# Patient Record
Sex: FEMALE | Race: WHITE | Employment: OTHER | URBAN - METROPOLITAN AREA
[De-identification: names, ages, dates, MRNs, and addresses within clinical notes are randomized per-mention and may not be internally consistent; named-entity substitution may affect disease eponyms.]

---

## 2017-11-20 PROCEDURE — 87086 URINE CULTURE/COLONY COUNT: CPT | Performed by: FAMILY MEDICINE

## 2017-11-20 PROCEDURE — 87624 HPV HI-RISK TYP POOLED RSLT: CPT | Performed by: FAMILY MEDICINE

## 2017-11-20 PROCEDURE — 81003 URINALYSIS AUTO W/O SCOPE: CPT | Performed by: FAMILY MEDICINE

## 2017-11-20 PROCEDURE — 88175 CYTOPATH C/V AUTO FLUID REDO: CPT | Performed by: FAMILY MEDICINE

## 2018-09-05 PROCEDURE — 87081 CULTURE SCREEN ONLY: CPT | Performed by: EMERGENCY MEDICINE

## 2018-11-21 PROCEDURE — 86803 HEPATITIS C AB TEST: CPT | Performed by: FAMILY MEDICINE

## 2019-05-23 PROCEDURE — 86160 COMPLEMENT ANTIGEN: CPT | Performed by: FAMILY MEDICINE

## 2019-05-23 PROCEDURE — 82570 ASSAY OF URINE CREATININE: CPT | Performed by: FAMILY MEDICINE

## 2019-05-23 PROCEDURE — 84156 ASSAY OF PROTEIN URINE: CPT | Performed by: FAMILY MEDICINE

## 2019-05-23 PROCEDURE — 81003 URINALYSIS AUTO W/O SCOPE: CPT | Performed by: FAMILY MEDICINE

## 2019-05-25 PROCEDURE — 84300 ASSAY OF URINE SODIUM: CPT | Performed by: FAMILY MEDICINE

## 2019-05-25 PROCEDURE — 83930 ASSAY OF BLOOD OSMOLALITY: CPT | Performed by: FAMILY MEDICINE

## 2019-06-11 PROCEDURE — 82024 ASSAY OF ACTH: CPT | Performed by: FAMILY MEDICINE

## 2019-07-06 ENCOUNTER — LAB ENCOUNTER (OUTPATIENT)
Dept: LAB | Age: 59
End: 2019-07-06
Attending: INTERNAL MEDICINE
Payer: COMMERCIAL

## 2019-07-06 DIAGNOSIS — Z00.00 ROUTINE GENERAL MEDICAL EXAMINATION AT A HEALTH CARE FACILITY: Primary | ICD-10-CM

## 2019-07-06 LAB
CRP SERPL-MCNC: <0.29 MG/DL (ref ?–0.3)
SED RATE-ML: 7 MM/HR (ref 0–25)

## 2019-07-06 PROCEDURE — 85652 RBC SED RATE AUTOMATED: CPT

## 2019-07-06 PROCEDURE — 86140 C-REACTIVE PROTEIN: CPT

## 2019-07-23 PROBLEM — E34.8 PINEAL GLAND CYST: Status: ACTIVE | Noted: 2019-07-23

## 2019-07-23 PROBLEM — I10 INTERMITTENT HYPERTENSION: Status: ACTIVE | Noted: 2019-07-23

## 2019-07-24 ENCOUNTER — LAB ENCOUNTER (OUTPATIENT)
Dept: LAB | Age: 59
End: 2019-07-24
Attending: INTERNAL MEDICINE
Payer: COMMERCIAL

## 2019-07-24 DIAGNOSIS — Z79.899 METHOTREXATE, LONG TERM, CURRENT USE: Primary | ICD-10-CM

## 2019-07-24 LAB
ALBUMIN SERPL-MCNC: 4.3 G/DL (ref 3.4–5)
ALBUMIN/GLOB SERPL: 1.6 {RATIO} (ref 1–2)
ALP LIVER SERPL-CCNC: 54 U/L (ref 50–130)
ALT SERPL-CCNC: 32 U/L (ref 13–56)
ANION GAP SERPL CALC-SCNC: 6 MMOL/L (ref 0–18)
AST SERPL-CCNC: 15 U/L (ref 15–37)
BASOPHILS # BLD AUTO: 0 X10(3) UL (ref 0–0.2)
BASOPHILS NFR BLD AUTO: 0 %
BILIRUB SERPL-MCNC: 0.8 MG/DL (ref 0.1–2)
BUN BLD-MCNC: 18 MG/DL (ref 7–18)
BUN/CREAT SERPL: 20.5 (ref 10–20)
CALCIUM BLD-MCNC: 9.9 MG/DL (ref 8.5–10.1)
CHLORIDE SERPL-SCNC: 96 MMOL/L (ref 98–112)
CO2 SERPL-SCNC: 32 MMOL/L (ref 21–32)
CREAT BLD-MCNC: 0.88 MG/DL (ref 0.55–1.02)
DEPRECATED RDW RBC AUTO: 47 FL (ref 35.1–46.3)
EOSINOPHIL # BLD AUTO: 0 X10(3) UL (ref 0–0.7)
EOSINOPHIL NFR BLD AUTO: 0 %
ERYTHROCYTE [DISTWIDTH] IN BLOOD BY AUTOMATED COUNT: 13.9 % (ref 11–15)
GLOBULIN PLAS-MCNC: 2.7 G/DL (ref 2.8–4.4)
GLUCOSE BLD-MCNC: 93 MG/DL (ref 70–99)
HCT VFR BLD AUTO: 38.1 % (ref 35–48)
HGB BLD-MCNC: 12.6 G/DL (ref 12–16)
IMM GRANULOCYTES # BLD AUTO: 0.06 X10(3) UL (ref 0–1)
IMM GRANULOCYTES NFR BLD: 0.8 %
LYMPHOCYTES # BLD AUTO: 0.27 X10(3) UL (ref 1–4)
LYMPHOCYTES NFR BLD AUTO: 3.6 %
M PROTEIN MFR SERPL ELPH: 7 G/DL (ref 6.4–8.2)
MCH RBC QN AUTO: 31 PG (ref 26–34)
MCHC RBC AUTO-ENTMCNC: 33.1 G/DL (ref 31–37)
MCV RBC AUTO: 93.6 FL (ref 80–100)
MONOCYTES # BLD AUTO: 0.24 X10(3) UL (ref 0.1–1)
MONOCYTES NFR BLD AUTO: 3.2 %
NEUTROPHILS # BLD AUTO: 6.97 X10 (3) UL (ref 1.5–7.7)
NEUTROPHILS # BLD AUTO: 6.97 X10(3) UL (ref 1.5–7.7)
NEUTROPHILS NFR BLD AUTO: 92.4 %
OSMOLALITY SERPL CALC.SUM OF ELEC: 280 MOSM/KG (ref 275–295)
PLATELET # BLD AUTO: 301 10(3)UL (ref 150–450)
POTASSIUM SERPL-SCNC: 5.5 MMOL/L (ref 3.5–5.1)
RBC # BLD AUTO: 4.07 X10(6)UL (ref 3.8–5.3)
SODIUM SERPL-SCNC: 134 MMOL/L (ref 136–145)
WBC # BLD AUTO: 7.5 X10(3) UL (ref 4–11)

## 2019-07-24 PROCEDURE — 85025 COMPLETE CBC W/AUTO DIFF WBC: CPT

## 2019-07-24 PROCEDURE — 80053 COMPREHEN METABOLIC PANEL: CPT

## 2019-08-12 PROBLEM — M23.91 ACUTE INTERNAL DERANGEMENT OF RIGHT KNEE: Status: ACTIVE | Noted: 2019-08-12

## 2019-08-26 PROBLEM — Z98.890 S/P RIGHT KNEE ARTHROSCOPY: Status: ACTIVE | Noted: 2019-08-26

## 2019-09-03 PROBLEM — Z98.890 STATUS POST LATERAL MENISCAL REPAIR: Status: ACTIVE | Noted: 2019-09-03

## 2023-07-27 ENCOUNTER — OFFICE VISIT (OUTPATIENT)
Dept: RHEUMATOLOGY | Facility: CLINIC | Age: 63
End: 2023-07-27

## 2023-07-27 ENCOUNTER — LAB ENCOUNTER (OUTPATIENT)
Dept: LAB | Age: 63
End: 2023-07-27
Attending: INTERNAL MEDICINE
Payer: MEDICARE

## 2023-07-27 VITALS
WEIGHT: 126.38 LBS | DIASTOLIC BLOOD PRESSURE: 82 MMHG | SYSTOLIC BLOOD PRESSURE: 147 MMHG | HEIGHT: 67.5 IN | BODY MASS INDEX: 19.6 KG/M2 | HEART RATE: 79 BPM

## 2023-07-27 DIAGNOSIS — M32.9 SYSTEMIC LUPUS ERYTHEMATOSUS, UNSPECIFIED SLE TYPE, UNSPECIFIED ORGAN INVOLVEMENT STATUS (HCC): Primary | ICD-10-CM

## 2023-07-27 DIAGNOSIS — Z51.81 MEDICATION MONITORING ENCOUNTER: ICD-10-CM

## 2023-07-27 DIAGNOSIS — M32.9 SYSTEMIC LUPUS ERYTHEMATOSUS, UNSPECIFIED SLE TYPE, UNSPECIFIED ORGAN INVOLVEMENT STATUS (HCC): ICD-10-CM

## 2023-07-27 LAB
ALBUMIN SERPL-MCNC: 4.2 G/DL (ref 3.4–5)
ALBUMIN/GLOB SERPL: 1.3 {RATIO} (ref 1–2)
ALP LIVER SERPL-CCNC: 47 U/L
ALT SERPL-CCNC: 29 U/L
ANION GAP SERPL CALC-SCNC: 5 MMOL/L (ref 0–18)
AST SERPL-CCNC: 21 U/L (ref 15–37)
BASOPHILS # BLD AUTO: 0.05 X10(3) UL (ref 0–0.2)
BASOPHILS NFR BLD AUTO: 1.6 %
BILIRUB SERPL-MCNC: 0.5 MG/DL (ref 0.1–2)
BILIRUB UR QL: NEGATIVE
BUN BLD-MCNC: 10 MG/DL (ref 7–18)
BUN/CREAT SERPL: 14.1 (ref 10–20)
C3 SERPL-MCNC: 73.3 MG/DL (ref 90–180)
C4 SERPL-MCNC: 12.7 MG/DL (ref 10–40)
CALCIUM BLD-MCNC: 9.4 MG/DL (ref 8.5–10.1)
CHLORIDE SERPL-SCNC: 101 MMOL/L (ref 98–112)
CLARITY UR: CLEAR
CO2 SERPL-SCNC: 29 MMOL/L (ref 21–32)
COLOR UR: COLORLESS
CREAT BLD-MCNC: 0.71 MG/DL
CREAT UR-SCNC: 17.5 MG/DL
CRP SERPL-MCNC: <0.29 MG/DL (ref ?–0.3)
DEPRECATED RDW RBC AUTO: 42 FL (ref 35.1–46.3)
EGFRCR SERPLBLD CKD-EPI 2021: 94 ML/MIN/1.73M2 (ref 60–?)
EOSINOPHIL # BLD AUTO: 0.08 X10(3) UL (ref 0–0.7)
EOSINOPHIL NFR BLD AUTO: 2.6 %
ERYTHROCYTE [DISTWIDTH] IN BLOOD BY AUTOMATED COUNT: 12.5 % (ref 11–15)
ERYTHROCYTE [SEDIMENTATION RATE] IN BLOOD: 8 MM/HR
FASTING STATUS PATIENT QL REPORTED: NO
GLOBULIN PLAS-MCNC: 3.2 G/DL (ref 2.8–4.4)
GLUCOSE BLD-MCNC: 108 MG/DL (ref 70–99)
GLUCOSE UR-MCNC: NORMAL MG/DL
HCT VFR BLD AUTO: 38.5 %
HGB BLD-MCNC: 13 G/DL
HGB UR QL STRIP.AUTO: NEGATIVE
IMM GRANULOCYTES # BLD AUTO: 0 X10(3) UL (ref 0–1)
IMM GRANULOCYTES NFR BLD: 0 %
KETONES UR-MCNC: NEGATIVE MG/DL
LEUKOCYTE ESTERASE UR QL STRIP.AUTO: NEGATIVE
LYMPHOCYTES # BLD AUTO: 0.7 X10(3) UL (ref 1–4)
LYMPHOCYTES NFR BLD AUTO: 22.4 %
MCH RBC QN AUTO: 31 PG (ref 26–34)
MCHC RBC AUTO-ENTMCNC: 33.8 G/DL (ref 31–37)
MCV RBC AUTO: 91.9 FL
MONOCYTES # BLD AUTO: 0.28 X10(3) UL (ref 0.1–1)
MONOCYTES NFR BLD AUTO: 9 %
NEUTROPHILS # BLD AUTO: 2.01 X10 (3) UL (ref 1.5–7.7)
NEUTROPHILS # BLD AUTO: 2.01 X10(3) UL (ref 1.5–7.7)
NEUTROPHILS NFR BLD AUTO: 64.4 %
NITRITE UR QL STRIP.AUTO: NEGATIVE
OSMOLALITY SERPL CALC.SUM OF ELEC: 280 MOSM/KG (ref 275–295)
PH UR: 6.5 [PH] (ref 5–8)
PLATELET # BLD AUTO: 259 10(3)UL (ref 150–450)
POTASSIUM SERPL-SCNC: 3.9 MMOL/L (ref 3.5–5.1)
PROT SERPL-MCNC: 7.4 G/DL (ref 6.4–8.2)
PROT UR-MCNC: <5 MG/DL
PROT UR-MCNC: NEGATIVE MG/DL
RBC # BLD AUTO: 4.19 X10(6)UL
SODIUM SERPL-SCNC: 135 MMOL/L (ref 136–145)
SP GR UR STRIP: <1.005 (ref 1–1.03)
UROBILINOGEN UR STRIP-ACNC: NORMAL
WBC # BLD AUTO: 3.1 X10(3) UL (ref 4–11)

## 2023-07-27 PROCEDURE — 83516 IMMUNOASSAY NONANTIBODY: CPT

## 2023-07-27 PROCEDURE — 85025 COMPLETE CBC W/AUTO DIFF WBC: CPT | Performed by: INTERNAL MEDICINE

## 2023-07-27 PROCEDURE — 82570 ASSAY OF URINE CREATININE: CPT

## 2023-07-27 PROCEDURE — 99204 OFFICE O/P NEW MOD 45 MIN: CPT | Performed by: INTERNAL MEDICINE

## 2023-07-27 PROCEDURE — 36415 COLL VENOUS BLD VENIPUNCTURE: CPT | Performed by: INTERNAL MEDICINE

## 2023-07-27 PROCEDURE — 85652 RBC SED RATE AUTOMATED: CPT | Performed by: INTERNAL MEDICINE

## 2023-07-27 PROCEDURE — 80053 COMPREHEN METABOLIC PANEL: CPT | Performed by: INTERNAL MEDICINE

## 2023-07-27 PROCEDURE — 86036 ANCA SCREEN EACH ANTIBODY: CPT

## 2023-07-27 PROCEDURE — 84156 ASSAY OF PROTEIN URINE: CPT

## 2023-07-27 PROCEDURE — 86235 NUCLEAR ANTIGEN ANTIBODY: CPT

## 2023-07-27 PROCEDURE — 86160 COMPLEMENT ANTIGEN: CPT | Performed by: INTERNAL MEDICINE

## 2023-07-27 PROCEDURE — 86038 ANTINUCLEAR ANTIBODIES: CPT | Performed by: INTERNAL MEDICINE

## 2023-07-27 PROCEDURE — 86140 C-REACTIVE PROTEIN: CPT | Performed by: INTERNAL MEDICINE

## 2023-07-27 RX ORDER — NIFEDIPINE 30 MG/1
30 TABLET, FILM COATED, EXTENDED RELEASE ORAL DAILY
Qty: 90 TABLET | Refills: 1 | Status: SHIPPED | OUTPATIENT
Start: 2023-07-27

## 2023-07-27 RX ORDER — DEXTROAMPHETAMINE SACCHARATE, AMPHETAMINE ASPARTATE, DEXTROAMPHETAMINE SULFATE AND AMPHETAMINE SULFATE 5; 5; 5; 5 MG/1; MG/1; MG/1; MG/1
TABLET ORAL
COMMUNITY
Start: 2023-05-23

## 2023-07-27 RX ORDER — HYDROXYCHLOROQUINE SULFATE 200 MG/1
200 TABLET, FILM COATED ORAL DAILY
Qty: 90 TABLET | Refills: 1 | Status: SHIPPED | OUTPATIENT
Start: 2023-07-27

## 2023-07-27 NOTE — PATIENT INSTRUCTIONS
You were seen today for the history of lupus causing some of the minor joint pain/swelling, Raynaud's, chilblains, livedo  Symptoms appear stable  Continue Plaquenil  I refilled both Plaquenil and nifedipine  Blood work today  You can see me in 6 months

## 2023-08-04 LAB
ANCA BY IFA (RDL): NEGATIVE
ANTI-MPO AB (RDL): <20 UNITS
ANTI-PR-3 AB (RDL): <20 UNITS

## 2023-08-15 LAB
ANTI-EJ: NEGATIVE
ANTI-JO-1: <20 UNITS
ANTI-KU: NEGATIVE
ANTI-MDA-5: <20 UNITS
ANTI-MI-2 AB: NEGATIVE
ANTI-NXP-2: <20 UNITS
ANTI-OJ: NEGATIVE
ANTI-PL-12: NEGATIVE
ANTI-PL-7: NEGATIVE
ANTI-PM/SCL100: <20 UNITS
ANTI-SAE1: <20 UNITS
ANTI-SRP AB: NEGATIVE
ANTI-SSA 52KD IGG: <20 UNITS
ANTI-TIF-1GAMMA: <20 UNITS
ANTI-U1 RNP: <20 UNITS
ANTI-U2 RNP: NEGATIVE
ANTI-U3 RNP: NEGATIVE

## 2024-01-23 ENCOUNTER — OFFICE VISIT (OUTPATIENT)
Dept: RHEUMATOLOGY | Facility: CLINIC | Age: 64
End: 2024-01-23
Payer: MEDICARE

## 2024-01-23 ENCOUNTER — HOSPITAL ENCOUNTER (OUTPATIENT)
Dept: GENERAL RADIOLOGY | Facility: HOSPITAL | Age: 64
Discharge: HOME | End: 2024-01-23
Attending: INTERNAL MEDICINE
Payer: MEDICARE

## 2024-01-23 ENCOUNTER — LAB ENCOUNTER (OUTPATIENT)
Dept: LAB | Facility: HOSPITAL | Age: 64
End: 2024-01-23
Attending: INTERNAL MEDICINE
Payer: MEDICARE

## 2024-01-23 VITALS
DIASTOLIC BLOOD PRESSURE: 75 MMHG | SYSTOLIC BLOOD PRESSURE: 122 MMHG | BODY MASS INDEX: 17.68 KG/M2 | HEART RATE: 85 BPM | HEIGHT: 67.5 IN | WEIGHT: 114 LBS

## 2024-01-23 DIAGNOSIS — Z51.81 MEDICATION MONITORING ENCOUNTER: ICD-10-CM

## 2024-01-23 DIAGNOSIS — M32.9 SYSTEMIC LUPUS ERYTHEMATOSUS, UNSPECIFIED SLE TYPE, UNSPECIFIED ORGAN INVOLVEMENT STATUS (HCC): Primary | ICD-10-CM

## 2024-01-23 DIAGNOSIS — M32.9 SYSTEMIC LUPUS ERYTHEMATOSUS, UNSPECIFIED SLE TYPE, UNSPECIFIED ORGAN INVOLVEMENT STATUS (HCC): ICD-10-CM

## 2024-01-23 LAB
ALBUMIN SERPL-MCNC: 4.6 G/DL (ref 3.2–4.8)
ALT SERPL-CCNC: 16 U/L
AST SERPL-CCNC: 22 U/L (ref ?–34)
BASOPHILS # BLD AUTO: 0.04 X10(3) UL (ref 0–0.2)
BASOPHILS NFR BLD AUTO: 1.4 %
C3 SERPL-MCNC: 73.2 MG/DL (ref 90–170)
C4 SERPL-MCNC: 14 MG/DL (ref 12–36)
CREAT BLD-MCNC: 0.68 MG/DL
CRP SERPL-MCNC: <0.4 MG/DL (ref ?–1)
DEPRECATED RDW RBC AUTO: 41.4 FL (ref 35.1–46.3)
EGFRCR SERPLBLD CKD-EPI 2021: 97 ML/MIN/1.73M2 (ref 60–?)
EOSINOPHIL # BLD AUTO: 0.09 X10(3) UL (ref 0–0.7)
EOSINOPHIL NFR BLD AUTO: 3.1 %
ERYTHROCYTE [DISTWIDTH] IN BLOOD BY AUTOMATED COUNT: 12.1 % (ref 11–15)
ERYTHROCYTE [SEDIMENTATION RATE] IN BLOOD: 12 MM/HR
HCT VFR BLD AUTO: 36.9 %
HGB BLD-MCNC: 12.5 G/DL
IMM GRANULOCYTES # BLD AUTO: 0 X10(3) UL (ref 0–1)
IMM GRANULOCYTES NFR BLD: 0 %
LYMPHOCYTES # BLD AUTO: 0.72 X10(3) UL (ref 1–4)
LYMPHOCYTES NFR BLD AUTO: 25 %
MCH RBC QN AUTO: 31.3 PG (ref 26–34)
MCHC RBC AUTO-ENTMCNC: 33.9 G/DL (ref 31–37)
MCV RBC AUTO: 92.5 FL
MONOCYTES # BLD AUTO: 0.34 X10(3) UL (ref 0.1–1)
MONOCYTES NFR BLD AUTO: 11.8 %
NEUTROPHILS # BLD AUTO: 1.69 X10 (3) UL (ref 1.5–7.7)
NEUTROPHILS # BLD AUTO: 1.69 X10(3) UL (ref 1.5–7.7)
NEUTROPHILS NFR BLD AUTO: 58.7 %
PLATELET # BLD AUTO: 298 10(3)UL (ref 150–450)
RBC # BLD AUTO: 3.99 X10(6)UL
WBC # BLD AUTO: 2.9 X10(3) UL (ref 4–11)

## 2024-01-23 PROCEDURE — 71046 X-RAY EXAM CHEST 2 VIEWS: CPT | Performed by: INTERNAL MEDICINE

## 2024-01-23 PROCEDURE — 82040 ASSAY OF SERUM ALBUMIN: CPT

## 2024-01-23 PROCEDURE — 84450 TRANSFERASE (AST) (SGOT): CPT

## 2024-01-23 PROCEDURE — 86225 DNA ANTIBODY NATIVE: CPT

## 2024-01-23 PROCEDURE — 85652 RBC SED RATE AUTOMATED: CPT

## 2024-01-23 PROCEDURE — 82565 ASSAY OF CREATININE: CPT

## 2024-01-23 PROCEDURE — 86160 COMPLEMENT ANTIGEN: CPT

## 2024-01-23 PROCEDURE — 86140 C-REACTIVE PROTEIN: CPT

## 2024-01-23 PROCEDURE — 99214 OFFICE O/P EST MOD 30 MIN: CPT | Performed by: INTERNAL MEDICINE

## 2024-01-23 PROCEDURE — 85025 COMPLETE CBC W/AUTO DIFF WBC: CPT

## 2024-01-23 PROCEDURE — 84460 ALANINE AMINO (ALT) (SGPT): CPT

## 2024-01-23 PROCEDURE — 36415 COLL VENOUS BLD VENIPUNCTURE: CPT

## 2024-01-23 RX ORDER — ATOMOXETINE 80 MG/1
80 CAPSULE ORAL DAILY
COMMUNITY
Start: 2023-08-16

## 2024-01-23 RX ORDER — HYDROXYCHLOROQUINE SULFATE 200 MG/1
200 TABLET, FILM COATED ORAL DAILY
Qty: 90 TABLET | Refills: 1 | Status: SHIPPED | OUTPATIENT
Start: 2024-01-23

## 2024-01-23 RX ORDER — TRIAMCINOLONE ACETONIDE 1 MG/G
CREAM TOPICAL
Qty: 1 EACH | Refills: 0 | Status: SHIPPED | OUTPATIENT
Start: 2024-01-23

## 2024-01-23 NOTE — PATIENT INSTRUCTIONS
You were seen today for lupus  Symptoms are pretty controlled  Continue hydroxychloroquine 1 pill a day and nifedipine for the Raynaud's and chilblains  I gave you a triamcinolone, steroid cream to use once or twice a day for your child plans  Blood work today  Can see me in 6 months

## 2024-01-23 NOTE — PROGRESS NOTES
Pauline Allen is a 63 year old female.    HPI:     Chief Complaint   Patient presents with    Follow - Up     Systemic lupus erythematosus, unspecified SLE type, unspecified organ involvement status        I had the pleasure of seeing Pauline Allen on 1/23/2024 for follow up SLE (+ CARL, double-stranded DNA, low C3, RF, chilblains, Raynaud's, chronic livedo reticularis)     Current medications:   mg daily  Nifedipine 30 mg daily   Fosamax- for about 5 years  Previous medications:  Humira- stopped due to perforation of colon from routine colonoscopy   Methotrexate- no s/e but stopped after perforation of colon from routine colonoscopy   Blood work:  [facility] 2017: +ANA1:320 nuclear pattern, +dsDNA 334, RF 8.1, Cardiolipin IgM 47, atypical p-ANCA 1:80  Neg CARL, dsDNA, ESR, RF, CCPLyme , Low C3  Neg myositis panel and ANCA    Interval History:  This is a 62 yo F with hx of Osteopenia, Colonic perforation s/p partial resection 2015 presents to establish care for lupus.  She was initially seen by Dr. Santiago Hill in 2017 and at that time presented with joint pain and swelling involving her hands.  She was found to have a positive RF and was treated with methotrexate.  She then developed high inflammation markers and was switched to Humira.  She was on Humira for about 9 months with no improvement.  She was also responsive to prednisone.  She then has been seeing rheumatologist Dr. Bangura and eventually diagnosed with lupus.  She was found to have a + CARL, double-stranded DNA, RF, low C3, chilblains, Raynaud's, livedo reticularis.  She has been maintained on Plaquenil 200 mg daily and nifedipine 30 mg daily.  In the winter her Raynaud's and chilblains worsens.  She has thickening of her fingers and during the winter he has become very dry and look like  hands.  She has no weakness in her muscles.  Denies any acid reflux.  Has some telangiectasia on her nose.  She has minimal joint pain involving her  wrists.  Has some mild swelling in her right knee but no pain.  Denies any shortness of breath or chest pain.  Has chronic diarrhea after colonic perforation.  In the summer 2019 she had temple pain, temporal biopsy was negative for GCA.  Mom had ANCA vasculitis and passed away from it.    1/23/2024:  Presents for f/u of Overlap syndrome/SLE (+ CARL, double-stranded DNA, low C3, RF, chilblains, Raynaud's, chronic livedo reticularis)   She reports that her chilblains is pretty severe in December.  She had red nodules on her fingers.  Now on nifedipine and her symptoms have improved  Has some mild stiffness in her joints otherwise no swelling in her joints.  Mild pain in her wrist.  Denies any recent rash.  No recent infection.  She was seen by eye doctor January 4, no evidence of Plaquenil toxicity        HISTORY:  Past Medical History:   Diagnosis Date    Family history of rheumatoid arthritis     Raynaud's disease       Social Hx Reviewed   Family Hx Reviewed     Medications (Active prior to today's visit):  Current Outpatient Medications   Medication Sig Dispense Refill    amphetamine-dextroamphetamine (ADDERALL) 20 MG Oral Tab       hydroxychloroquine 200 MG Oral Tab Take 1 tablet (200 mg total) by mouth daily. 90 tablet 1    NIFEdipine ER 30 MG Oral Tablet 24 Hr Take 1 tablet (30 mg total) by mouth daily. 90 tablet 1    alendronate 70 MG Oral Tab Take 1 tablet (70 mg total) by mouth every 7 days. 13 tablet 1    gabapentin 300 MG Oral Cap Take one tablet one the first day, then take one tablet twice a day on day 2, then three times daily as needed 60 capsule 1    Lisdexamfetamine Dimesylate 70 MG Oral Cap Take 70 mg by mouth every morning.      Omega-3 Fatty Acids (FISH OIL OR) daily      aspirin 81 MG Oral Tab Take 81 mg by mouth daily.      escitalopram 10 MG Oral Tab Take 1 tablet (10 mg total) by mouth daily.  0    TraZODone HCl 50 MG Oral Tab        .cmed  Allergies:  Allergies   Allergen Reactions    Nickel  ITCHING         ROS:   All other ROS are negative.     PHYSICAL EXAM:   GEN: AAOx3, NAD  HEENT: EOMI, PERRLA, no injection or icterus, oral mucosa moist, no oral lesions. No lymphadenopathy. No facial rash  CVS: RRR, no murmurs rubs or gallops. Equal 2+ distal pulses.   LUNGS: CTAB, no increased work of breathing  ABDOMEN:  soft NT/ND, +BS, no HSM  SKIN: mild telangiectasia on her nose  MSK:  Bilateral hands/fingers with enlargement.  Able to make a fist  NEURO: Cranial nerves II-XII intact grossly. 5/5 strength throughout in both upper and lower extremities, sensation intact.  PSYCH: normal mood       LABS:     Component      Latest Ref Rng 7/27/2023   WBC      4.0 - 11.0 x10(3) uL 3.1 (L)    RBC      3.80 - 5.30 x10(6)uL 4.19    Hemoglobin      12.0 - 16.0 g/dL 13.0    Hematocrit      35.0 - 48.0 % 38.5    MCV      80.0 - 100.0 fL 91.9    MCH      26.0 - 34.0 pg 31.0    MCHC      31.0 - 37.0 g/dL 33.8    RDW-SD      35.1 - 46.3 fL 42.0    RDW      11.0 - 15.0 % 12.5    Platelet Count      150.0 - 450.0 10(3)uL 259.0    Prelim Neutrophil Abs      1.50 - 7.70 x10 (3) uL 2.01    Neutrophils Absolute      1.50 - 7.70 x10(3) uL 2.01    Lymphocytes Absolute      1.00 - 4.00 x10(3) uL 0.70 (L)    Monocytes Absolute      0.10 - 1.00 x10(3) uL 0.28    Eosinophils Absolute      0.00 - 0.70 x10(3) uL 0.08    Basophils Absolute      0.00 - 0.20 x10(3) uL 0.05    Immature Granulocyte Absolute      0.00 - 1.00 x10(3) uL 0.00    Neutrophils %      % 64.4    Lymphocytes %      % 22.4    Monocytes %      % 9.0    Eosinophils %      % 2.6    Basophils %      % 1.6    Immature Granulocyte %      % 0.0    Glucose      70 - 99 mg/dL 108 (H)    Sodium      136 - 145 mmol/L 135 (L)    Potassium      3.5 - 5.1 mmol/L 3.9    Chloride      98 - 112 mmol/L 101    Carbon Dioxide, Total      21.0 - 32.0 mmol/L 29.0    ANION GAP      0 - 18 mmol/L 5    BUN      7 - 18 mg/dL 10    CREATININE      0.55 - 1.02 mg/dL 0.71    BUN/CREATININE RATIO       10.0 - 20.0  14.1    CALCIUM      8.5 - 10.1 mg/dL 9.4    CALCULATED OSMOLALITY      275 - 295 mOsm/kg 280    EGFR      >=60 mL/min/1.73m2 94    ALT (SGPT)      13 - 56 U/L 29    AST (SGOT)      15 - 37 U/L 21    ALKALINE PHOSPHATASE      50 - 130 U/L 47 (L)    Total Bilirubin      0.1 - 2.0 mg/dL 0.5    PROTEIN, TOTAL      6.4 - 8.2 g/dL 7.4    Albumin      3.4 - 5.0 g/dL 4.2    Globulin      2.8 - 4.4 g/dL 3.2    A/G Ratio      1.0 - 2.0  1.3    Patient Fasting for CMP? No    Anti-WICHO-1      <20 Units <20    Anti-PL-12 Ab (RDL)      Negative  Negative    Anti-EJ Ab (RDL)      Negative  Negative    Anti-OJ Ab (RDL)      Negative  Negative    Anti-SRP Ab (RDL)      Negative  Negative    Anti-Mi-2 Ab (RDL)      Negative  Negative    Anti-TIF-1gamma Ab (RDL)      <20 Units <20    Anti-MDA-5 Ab (CADM-140) (RDL)      <20 Units <20    Anti-NXP-2 (P140) Ab (RDL)      <20 Units <20    Anti-SAE1 Ab (RDL)      <20 Units <20    Anti-Pm/Scl-100 Ab (RDL)      <20 Units <20    Anti-Ku Ab (RDL)      Negative  Negative    Anti-SS-A 52kD Ab, IgG (RDL)      <20 Units <20    Anti-U1 RNP Ab (RDL)      <20 Units <20    Anti-U2 RNP Ab (RDL)      Negative  Negative    Anti-U3 RNP (Fibrillarin) (RDL)      Negative  Negative    Anti-PL-7 Ab (RDL)      Negative  Negative    Color Urine      Yellow  Colorless !    Clarity Urine      Clear  Clear    Spec Gravity      1.005 - 1.030  <1.005 (L)    Glucose Urine      Normal mg/dL Normal    Bilirubin Urine      Negative  Negative    Ketones, UA      Negative mg/dL Negative    Blood Urine      Negative  Negative    PH Urine      5.0 - 8.0  6.5    Protein Urine      Negative, Trace mg/dL Negative    Urobilinogen Urine      Normal  Normal    Nitrite Urine      Negative  Negative    Leukocyte Esterase       Negative  Negative    TOTAL PROTEIN URINE RANDOM      mg/dL <5.0    CREATININE UR RANDOM      mg/dL 17.50    Urine Protein/Creatinine Ratio, Random --    ANCA by IFA (RDL)      Negative   Negative    MYELOPEROX ANTIBODIES, IGG      <20 Units <20    SERINE PROTEASE3, IGG      <20 Units <20    C-REACTIVE PROTEIN      <0.30 mg/dL <0.29    SED RATE      0 - 30 mm/Hr 8    COMPLEMENT C3      90.0 - 180.0 mg/dL 73.3 (L)    COMPLEMENT C4      10.0 - 40.0 mg/dL 12.7         Imaging:     None    ASSESSMENT/PLAN:     Overlap syndrome/SLE (+ CARL, double-stranded DNA, low C3, RF, chilblains, Raynaud's, chronic livedo reticularis, telangiectasias)   - Initially diagnosed with inflammatory arthritis and was on methotrexate but discontinued as it caused high LFTs.  Then on Humira for 9 months but did not help  -She also has findings of telangiectasias, skin thickening and enlargement in her hands that can be seen in scleroderma.  Myositis panel negative  - Now on Plaquenil 200 mg daily.  She follows with ophthalmology every 6 months, last seen Jan 4, 2024  - For her Raynaud's and chilblains also on nifedipine 30 mg daily  - Plan to get chest x-ray to evaluate for any ILD, does not have any shortness of breath  - Blood work today     Bilateral temporal pain, resolved  - Temporal biopsy is done in the summer 2019 were negative    Pt will f/u in 6 mos     Karuna Laboy MD  1/23/2024   9:41 AM

## 2024-01-26 LAB — DSDNA AB TITR SER: <10 {TITER}

## 2024-06-24 ENCOUNTER — TELEPHONE (OUTPATIENT)
Dept: RHEUMATOLOGY | Facility: CLINIC | Age: 64
End: 2024-06-24

## 2024-06-24 NOTE — TELEPHONE ENCOUNTER
Current Outpatient Medications   Medication Sig Dispense Refill    NIFEdipine ER 30 MG Oral Tablet 24 Hr Take 1 tablet (30 mg total) by mouth daily. 90 tablet 1

## 2024-06-24 NOTE — TELEPHONE ENCOUNTER
Current Outpatient Medications   Medication Sig Dispense Refill    hydroxychloroquine 200 MG Oral Tab Take 1 tablet (200 mg total) by mouth daily. 90 tablet 1

## 2024-06-25 RX ORDER — NIFEDIPINE 30 MG
30 TABLET, EXTENDED RELEASE ORAL DAILY
Qty: 90 TABLET | Refills: 1 | Status: SHIPPED | OUTPATIENT
Start: 2024-06-25

## 2024-06-25 RX ORDER — HYDROXYCHLOROQUINE SULFATE 200 MG/1
200 TABLET, FILM COATED ORAL DAILY
Qty: 90 TABLET | Refills: 1 | Status: SHIPPED | OUTPATIENT
Start: 2024-06-25

## 2024-06-25 NOTE — TELEPHONE ENCOUNTER
LOV: 1/23/2024  eye exam 1/4/2024  Future Appointments   Date Time Provider Department Center   7/17/2024  9:00 AM Karuna Laboy MD [id number]     Labs:    Component      Latest Ref Rng 1/23/2024   WBC      4.0 - 11.0 x10(3) uL 2.9 (L)    RBC      3.80 - 5.30 x10(6)uL 3.99    Hemoglobin      12.0 - 16.0 g/dL 12.5    Hematocrit      35.0 - 48.0 % 36.9    MCV      80.0 - 100.0 fL 92.5    MCH      26.0 - 34.0 pg 31.3    MCHC      31.0 - 37.0 g/dL 33.9    RDW-SD      35.1 - 46.3 fL 41.4    RDW      11.0 - 15.0 % 12.1    Platelet Count      150.0 - 450.0 10(3)uL 298.0    Prelim Neutrophil Abs      1.50 - 7.70 x10 (3) uL 1.69    Neutrophils Absolute      1.50 - 7.70 x10(3) uL 1.69    Lymphocytes Absolute      1.00 - 4.00 x10(3) uL 0.72 (L)    Monocytes Absolute      0.10 - 1.00 x10(3) uL 0.34    Eosinophils Absolute      0.00 - 0.70 x10(3) uL 0.09    Basophils Absolute      0.00 - 0.20 x10(3) uL 0.04    Immature Granulocyte Absolute      0.00 - 1.00 x10(3) uL 0.00    Neutrophils %      % 58.7    Lymphocytes %      % 25.0    Monocytes %      % 11.8    Eosinophils %      % 3.1    Basophils %      % 1.4    Immature Granulocyte %      % 0.0    CREATININE      0.55 - 1.02 mg/dL 0.68    EGFR      >=60 mL/min/1.73m2 97    COMPLEMENT C4      12.0 - 36.0 mg/dL 14.0    COMPLEMENT C3      90.0 - 170.0 mg/dL 73.2 (L)    Anti Double Strand DNA      <10  <10    SED RATE      0 - 30 mm/Hr 12    C-REACTIVE PROTEIN      <1.00 mg/dL <0.40    AST (SGOT)      <=34 U/L 22    ALT (SGPT)      10 - 49 U/L 16    Albumin      3.2 - 4.8 g/dL 4.6       Legend:  (L) Low

## 2024-07-17 ENCOUNTER — TELEPHONE (OUTPATIENT)
Dept: RHEUMATOLOGY | Facility: CLINIC | Age: 64
End: 2024-07-17

## 2024-07-17 ENCOUNTER — OFFICE VISIT (OUTPATIENT)
Dept: RHEUMATOLOGY | Facility: CLINIC | Age: 64
End: 2024-07-17
Payer: MEDICARE

## 2024-07-17 VITALS
SYSTOLIC BLOOD PRESSURE: 129 MMHG | WEIGHT: 120 LBS | HEART RATE: 77 BPM | DIASTOLIC BLOOD PRESSURE: 79 MMHG | HEIGHT: 67.5 IN | BODY MASS INDEX: 18.61 KG/M2

## 2024-07-17 DIAGNOSIS — Z51.81 MEDICATION MONITORING ENCOUNTER: ICD-10-CM

## 2024-07-17 DIAGNOSIS — M32.9 SYSTEMIC LUPUS ERYTHEMATOSUS, UNSPECIFIED SLE TYPE, UNSPECIFIED ORGAN INVOLVEMENT STATUS (HCC): Primary | ICD-10-CM

## 2024-07-17 DIAGNOSIS — I73.00 RAYNAUD'S DISEASE WITHOUT GANGRENE: ICD-10-CM

## 2024-07-17 PROCEDURE — 99214 OFFICE O/P EST MOD 30 MIN: CPT | Performed by: INTERNAL MEDICINE

## 2024-07-17 PROCEDURE — G2211 COMPLEX E/M VISIT ADD ON: HCPCS | Performed by: INTERNAL MEDICINE

## 2024-07-17 RX ORDER — SILDENAFIL CITRATE 20 MG/1
20 TABLET ORAL 2 TIMES DAILY
Qty: 60 TABLET | Refills: 0 | Status: SHIPPED | OUTPATIENT
Start: 2024-07-17

## 2024-07-17 NOTE — PROGRESS NOTES
Pauline Allen is a 64 year old female.    HPI:     Chief Complaint   Patient presents with    SLE       I had the pleasure of seeing Pauline Allen on 7/17/2024 for follow up SLE (+ CARL, double-stranded DNA, low C3, RF, chilblains, Raynaud's, chronic livedo reticularis)     Current medications:   mg daily   Fosamax- for about 5-7 years, stopped 2023 for drug holiday   Previous medications:  Humira- stopped due to perforation of colon from routine colonoscopy   Methotrexate- no s/e but stopped after perforation of colon from routine colonoscopy   Nifedipine 30 mg daily- causes pedal edema and swelling   Blood work:  [facility] 2017: +ANA1:320 nuclear pattern, +dsDNA 334, RF 8.1, Cardiolipin IgM 47, atypical p-ANCA 1:80  Neg CARL, dsDNA, ESR, RF, CCPLyme , Low C3  Neg myositis panel and ANCA    Interval History:  This is a 62 yo F with hx of Osteopenia, Colonic perforation s/p partial resection 2015 presents to establish care for lupus.  She was initially seen by Dr. Santiago Hill in 2017 and at that time presented with joint pain and swelling involving her hands.  She was found to have a positive RF and was treated with methotrexate.  She then developed high inflammation markers and was switched to Humira.  She was on Humira for about 9 months with no improvement.  She was also responsive to prednisone.  She then has been seeing rheumatologist Dr. Bangura and eventually diagnosed with lupus.  She was found to have a + CARL, double-stranded DNA, RF, low C3, chilblains, Raynaud's, livedo reticularis.  She has been maintained on Plaquenil 200 mg daily and nifedipine 30 mg daily.  In the winter her Raynaud's and chilblains worsens.  She has thickening of her fingers and during the winter he has become very dry and look like  hands.  She has no weakness in her muscles.  Denies any acid reflux.  Has some telangiectasia on her nose.  She has minimal joint pain involving her wrists.  Has some mild swelling  in her right knee but no pain.  Denies any shortness of breath or chest pain.  Has chronic diarrhea after colonic perforation.  In the summer 2019 she had temple pain, temporal biopsy was negative for GCA.  Mom had ANCA vasculitis and passed away from it.    1/23/2024:  Presents for f/u of Overlap syndrome/SLE (+ CARL, double-stranded DNA, low C3, RF, chilblains, Raynaud's, chronic livedo reticularis)   She reports that her chilblains is pretty severe in December.  She had red nodules on her fingers.  Now on nifedipine and her symptoms have improved  Has some mild stiffness in her joints otherwise no swelling in her joints.  Mild pain in her wrist.  Denies any recent rash.  No recent infection.  She was seen by eye doctor January 4, no evidence of Plaquenil toxicity    7/17/2024:  Presents for f/u of Overlap syndrome/SLE (+ CARL, double-stranded DNA, low C3, RF, chilblains, Raynaud's, chronic livedo reticularis)  Stopped nifedipine as it was causing pedal edema and even facial edema, resolved once stopped the raynaunds has worsened   Any fluctuation in temperature causing raynaunds to worsen, fingers are numb, no sores  Some mild wrist pain, no swelling  No sob or trouble swallowing   She does livedo reticularis in lower legs         HISTORY:  Past Medical History:    Family history of rheumatoid arthritis    Raynaud's disease      Social Hx Reviewed   Family Hx Reviewed     Medications (Active prior to today's visit):  Current Outpatient Medications   Medication Sig Dispense Refill    hydroxychloroquine 200 MG Oral Tab Take 1 tablet (200 mg total) by mouth daily. 90 tablet 1    Atomoxetine HCl 80 MG Oral Cap Take 1 capsule (80 mg total) by mouth daily.      escitalopram 10 MG Oral Tab Take 1 tablet (10 mg total) by mouth daily.  0    TraZODone HCl 50 MG Oral Tab Take 1 tablet (50 mg total) by mouth nightly.      NIFEdipine ER 30 MG Oral Tablet 24 Hr Take 1 tablet (30 mg total) by mouth daily. (Patient not taking:  Reported on 7/17/2024) 90 tablet 1    triamcinolone 0.1 % External Cream Use on fingers 1-2 times a day as needed 1 each 0    amphetamine-dextroamphetamine (ADDERALL) 20 MG Oral Tab       alendronate 70 MG Oral Tab Take 1 tablet (70 mg total) by mouth every 7 days. 13 tablet 1    gabapentin 300 MG Oral Cap Take one tablet one the first day, then take one tablet twice a day on day 2, then three times daily as needed 60 capsule 1    Lisdexamfetamine Dimesylate 70 MG Oral Cap Take 70 mg by mouth every morning.      Omega-3 Fatty Acids (FISH OIL OR) daily      aspirin 81 MG Oral Tab Take 81 mg by mouth daily.       .cmed  Allergies:  Allergies   Allergen Reactions    Nickel ITCHING         ROS:   All other ROS are negative.     PHYSICAL EXAM:   GEN: AAOx3, NAD  HEENT: EOMI, PERRLA, no injection or icterus, oral mucosa moist, no oral lesions. No lymphadenopathy. No facial rash  CVS: RRR, no murmurs rubs or gallops. Equal 2+ distal pulses.   LUNGS: CTAB, no increased work of breathing  ABDOMEN:  soft NT/ND, +BS, no HSM  SKIN: mild telangiectasia on her nose, + Livedo reticularis in her legs  MSK:  Bilateral hands/fingers with enlargement.  Peripheral cyanosis in hands. Able to make a fist  NEURO: Cranial nerves II-XII intact grossly. 5/5 strength throughout in both upper and lower extremities, sensation intact.  PSYCH: normal mood       LABS:     Component      Latest Ref Rng 7/27/2023   WBC      4.0 - 11.0 x10(3) uL 3.1 (L)    RBC      3.80 - 5.30 x10(6)uL 4.19    Hemoglobin      12.0 - 16.0 g/dL 13.0    Hematocrit      35.0 - 48.0 % 38.5    MCV      80.0 - 100.0 fL 91.9    MCH      26.0 - 34.0 pg 31.0    MCHC      31.0 - 37.0 g/dL 33.8    RDW-SD      35.1 - 46.3 fL 42.0    RDW      11.0 - 15.0 % 12.5    Platelet Count      150.0 - 450.0 10(3)uL 259.0    Prelim Neutrophil Abs      1.50 - 7.70 x10 (3) uL 2.01    Neutrophils Absolute      1.50 - 7.70 x10(3) uL 2.01    Lymphocytes Absolute      1.00 - 4.00 x10(3) uL 0.70  (L)    Monocytes Absolute      0.10 - 1.00 x10(3) uL 0.28    Eosinophils Absolute      0.00 - 0.70 x10(3) uL 0.08    Basophils Absolute      0.00 - 0.20 x10(3) uL 0.05    Immature Granulocyte Absolute      0.00 - 1.00 x10(3) uL 0.00    Neutrophils %      % 64.4    Lymphocytes %      % 22.4    Monocytes %      % 9.0    Eosinophils %      % 2.6    Basophils %      % 1.6    Immature Granulocyte %      % 0.0    Glucose      70 - 99 mg/dL 108 (H)    Sodium      136 - 145 mmol/L 135 (L)    Potassium      3.5 - 5.1 mmol/L 3.9    Chloride      98 - 112 mmol/L 101    Carbon Dioxide, Total      21.0 - 32.0 mmol/L 29.0    ANION GAP      0 - 18 mmol/L 5    BUN      7 - 18 mg/dL 10    CREATININE      0.55 - 1.02 mg/dL 0.71    BUN/CREATININE RATIO      10.0 - 20.0  14.1    CALCIUM      8.5 - 10.1 mg/dL 9.4    CALCULATED OSMOLALITY      275 - 295 mOsm/kg 280    EGFR      >=60 mL/min/1.73m2 94    ALT (SGPT)      13 - 56 U/L 29    AST (SGOT)      15 - 37 U/L 21    ALKALINE PHOSPHATASE      50 - 130 U/L 47 (L)    Total Bilirubin      0.1 - 2.0 mg/dL 0.5    PROTEIN, TOTAL      6.4 - 8.2 g/dL 7.4    Albumin      3.4 - 5.0 g/dL 4.2    Globulin      2.8 - 4.4 g/dL 3.2    A/G Ratio      1.0 - 2.0  1.3    Patient Fasting for CMP? No    Anti-WICHO-1      <20 Units <20    Anti-PL-12 Ab (RDL)      Negative  Negative    Anti-EJ Ab (RDL)      Negative  Negative    Anti-OJ Ab (RDL)      Negative  Negative    Anti-SRP Ab (RDL)      Negative  Negative    Anti-Mi-2 Ab (RDL)      Negative  Negative    Anti-TIF-1gamma Ab (RDL)      <20 Units <20    Anti-MDA-5 Ab (CADM-140) (RDL)      <20 Units <20    Anti-NXP-2 (P140) Ab (RDL)      <20 Units <20    Anti-SAE1 Ab (RDL)      <20 Units <20    Anti-Pm/Scl-100 Ab (RDL)      <20 Units <20    Anti-Ku Ab (RDL)      Negative  Negative    Anti-SS-A 52kD Ab, IgG (RDL)      <20 Units <20    Anti-U1 RNP Ab (RDL)      <20 Units <20    Anti-U2 RNP Ab (RDL)      Negative  Negative    Anti-U3 RNP (Fibrillarin) (RDL)       Negative  Negative    Anti-PL-7 Ab (RDL)      Negative  Negative    Color Urine      Yellow  Colorless !    Clarity Urine      Clear  Clear    Spec Gravity      1.005 - 1.030  <1.005 (L)    Glucose Urine      Normal mg/dL Normal    Bilirubin Urine      Negative  Negative    Ketones, UA      Negative mg/dL Negative    Blood Urine      Negative  Negative    PH Urine      5.0 - 8.0  6.5    Protein Urine      Negative, Trace mg/dL Negative    Urobilinogen Urine      Normal  Normal    Nitrite Urine      Negative  Negative    Leukocyte Esterase       Negative  Negative    TOTAL PROTEIN URINE RANDOM      mg/dL <5.0    CREATININE UR RANDOM      mg/dL 17.50    Urine Protein/Creatinine Ratio, Random --    ANCA by IFA (RDL)      Negative  Negative    MYELOPEROX ANTIBODIES, IGG      <20 Units <20    SERINE PROTEASE3, IGG      <20 Units <20    C-REACTIVE PROTEIN      <0.30 mg/dL <0.29    SED RATE      0 - 30 mm/Hr 8    COMPLEMENT C3      90.0 - 180.0 mg/dL 73.3 (L)    COMPLEMENT C4      10.0 - 40.0 mg/dL 12.7         Imaging:     None    ASSESSMENT/PLAN:     Overlap syndrome/SLE (+ CARL, double-stranded DNA, low C3, RF, chilblains, Raynaud's, chronic livedo reticularis, telangiectasias)   - Initially diagnosed with inflammatory arthritis and was on methotrexate but discontinued as it caused high LFTs.  Then on Humira for 9 months but did not help  - She also has findings of telangiectasias, skin thickening and enlargement in her hands that can be seen in scleroderma.  Myositis panel negative  - CXR normal, no evidence of ILD  - Now on Plaquenil 200 mg daily.  She follows with ophthalmology every 6 months, last seen Jan 4, 2024  - For her Raynaud's and chilblains she was on nifedipine but causing worsening pedal edema so therefore stopped it  - Raynaud's is worse.  Will try sildenafil 20 mg daily she will increase it to 40 mg daily if she can tolerate it.  Advised to watch for dizziness, flushing and lightheadedness  - Blood work  reviewed mildly low white count  - she will get blood work in 6 mos     Bilateral temporal pain, resolved  - Temporal biopsy is done in the summer 2019 were negative    Pt will f/u in 6 mos     There is a longitudinal care relationship with me, the care plan reflects the ongoing nature of the continuous relationship of care, and the medical record indicates that there is ongoing treatment of a serious/complex medical condition which I am currently managing.  is Applicable.     Karuna Laboy MD  7/17/2024   9:14 AM

## 2024-07-17 NOTE — TELEPHONE ENCOUNTER
PA start    Prior authorization for: Sildenafil Citrate    Medication form:20mg tablets    Submission method:CoverMy Meds    Spoke with (if by phone):     Date submitted: 7/17/24    Tracking #: KEY [id number]

## 2024-07-18 NOTE — TELEPHONE ENCOUNTER
PA Approved - script already sent.    Prior authorization for: Sildenafil    Medication form:20mg tab    Date received: 7/17/24    Approval #:[id number]    Approved dates: through 12/31/2024    Pharmacy for medication:[facility]

## 2024-07-19 RX ORDER — HYDROXYCHLOROQUINE SULFATE 200 MG/1
200 TABLET, FILM COATED ORAL DAILY
Qty: 90 TABLET | Refills: 0 | OUTPATIENT
Start: 2024-07-19

## 2024-07-19 NOTE — TELEPHONE ENCOUNTER
Patient returned phone call. Per patient she would like her medication filled at:    [address] [address]

## 2024-10-22 ENCOUNTER — TELEPHONE (OUTPATIENT)
Dept: RHEUMATOLOGY | Facility: CLINIC | Age: 64
End: 2024-10-22

## 2024-10-28 DIAGNOSIS — I73.00 RAYNAUD'S DISEASE WITHOUT GANGRENE: ICD-10-CM

## 2024-10-29 RX ORDER — SILDENAFIL CITRATE 20 MG/1
20 TABLET ORAL 2 TIMES DAILY
Qty: 60 TABLET | Refills: 0 | Status: SHIPPED | OUTPATIENT
Start: 2024-10-29

## 2024-10-29 NOTE — TELEPHONE ENCOUNTER
Requested Prescriptions     Pending Prescriptions Disp Refills    SILDENAFIL 20 MG Oral Tab [Pharmacy Med Name: SILDENAFIL 20MG TABLETS] 60 tablet 0     Sig: TAKE 1 TABLET BY MOUTH IN THE MORNING AND 1 TABLET BEFORE BEDTIME     LOV: 7/17/24  Future Appointments   Date Time Provider Department Center   1/17/2025  8:20 AM Karuna Laboy MD [facility]     Labs:   COMPLETE BLOOD COUNT (CBC) WITH DIFFERENTIAL  Order: [id number]  Component  Ref Range & Units 7/2/24 10:58 AM   WBC  4.00 - 13.00 10^3/uL 3.28 Low    RBC  3.80 - 5.10 10^6/uL 4.29   Hemoglobin  12.0 - 16.0 g/dL 13.7   Hematocrit  34.0 - 50.0 % 40.1   MCV  81.0 - 100.0 fL 93.5   MCH  27.0 - 33.2 pg 31.9   MCHC  31.0 - 37.0 g/dL 34.2   Platelet Count  150 - 450 10^3/uL 279   RDW  11.5 - 16.0 % 12.6   MPV  7.0 - 11.5 fL 10.3   Neutrophils Absolute  1.30 - 6.70 10ˆ3/µL 2.02   Lymphocytes Absolute  0.90 - 4.00 10ˆ3/µL 0.75 Low    Monocytes Absolute  0.10 - 1.00 10ˆ3/µL 0.34   Eosinophils Absolute  0.00 - 0.30 10ˆ3/µL 0.10   Basophils Absolute  0.00 - 0.10 10ˆ3/µL 0.07   nRBC Absolute  0.000 - 0.012 10ˆ3/µL 0.000   Neutrophils %  % 61.6   Lymphocytes %  % 22.9   Monocytes %  % 10.4   Eosinophils %  % 3.0   Basophils %  % 2.1   nRBC/100 WBC  % 0.00   Resulting Agency [facility]     Specimen Collected: 07/02/24 10:58 AM    Performed by: [facility] Last Resulted: 07/02/24 11:47 AM   Received From: [facility]  Result Received: 07/17/24  8:46 AM    View Encounter        Rec       Contains abnormal data COMPREHENSIVE METABOLIC PANEL  Order: [id number]  Component  Ref Range & Units 7/2/24 10:58 AM   Patient Fasting? Yes   Glucose  74 - 109 mg/dL 112 High    Blood Urea Nitrogen  6.0 - 20.0 mg/dL 12.0   Creatinine  0.5 - 0.9 mg/dL 0.64   BUN/CREAT Ratio  10.0 - 20.0 19.0   Sodium  136 - 145 mmol/L 135 Low    Potassium  3.5 - 5.2 mmol/L 5.4 High    Chloride  98 - 107 mmol/L 96 Low    Carbon Dioxide  22.0 - 29.0 mmol/L 31.9  High    Calcium  8.6 - 10.3 mg/dL 10.0   Total Protein  6.4 - 8.3 g/dL 7.3   Albumin  3.5 - 5.2 g/dL 4.7   Bilirubin, Total  0.00 - 1.20 mg/dL 0.53   Alkaline Phosphatase  55 - 142 U/L 63   AST  0 - 32 U/L 21   ALT  0 - 33 U/L 16   GFR CKD-EPI  >=60.00 mL/min/1.73 m² 93.29   Comment: Estimated GFR units: mL/min/1.73 square meters  eGFR calculated by the CKD-EPI equation.   Resulting Agency [facility]     Specimen Collected: 07/02/24 10:58 AM    Performed by: [facility] Last Resulted: 07/02/24  1:07 PM   Received From: [facility]  Result Received: 07/17/24  8:46 AM    View Encounter        Received Information  ASSESSMENT/PLAN:      Overlap syndrome/SLE (+ CARL, double-stranded DNA, low C3, RF, chilblains, Raynaud's, chronic livedo reticularis, telangiectasias)   - Initially diagnosed with inflammatory arthritis and was on methotrexate but discontinued as it caused high LFTs.  Then on Humira for 9 months but did not help  - She also has findings of telangiectasias, skin thickening and enlargement in her hands that can be seen in scleroderma.  Myositis panel negative  - CXR normal, no evidence of ILD  - Now on Plaquenil 200 mg daily.  She follows with ophthalmology every 6 months, last seen Jan 4, 2024  - For her Raynaud's and chilblains she was on nifedipine but causing worsening pedal edema so therefore stopped it  - Raynaud's is worse.  Will try sildenafil 20 mg daily she will increase it to 40 mg daily if she can tolerate it.  Advised to watch for dizziness, flushing and lightheadedness  - Blood work reviewed mildly low white count  - she will get blood work in 6 mos      Bilateral temporal pain, resolved  - Temporal biopsy is done in the summer 2019 were negative     Pt will f/u in 6 mos      There is a longitudinal care relationship with me, the care plan reflects the ongoing nature of the continuous relationship of care, and the medical record indicates that there is  ongoing treatment of a serious/complex medical condition which I am currently managing.  is Applicable.      Karuna Laboy MD  7/17/2024   9:14 AM

## 2024-11-20 ENCOUNTER — PATIENT MESSAGE (OUTPATIENT)
Dept: RHEUMATOLOGY | Facility: CLINIC | Age: 64
End: 2024-11-20

## 2024-11-20 DIAGNOSIS — I73.00 RAYNAUD'S DISEASE WITHOUT GANGRENE: ICD-10-CM

## 2024-11-20 RX ORDER — SILDENAFIL CITRATE 20 MG/1
20 TABLET ORAL 2 TIMES DAILY
Qty: 60 TABLET | Refills: 0 | OUTPATIENT
Start: 2024-11-20

## 2024-11-21 NOTE — TELEPHONE ENCOUNTER
Requested Prescriptions     Pending Prescriptions Disp Refills    sildenafil 20 MG Oral Tab 60 tablet 0     Sig: Take 1 tablet (20 mg total) by mouth 2 (two) times daily.     LOV: 7/17/24  Future Appointments   Date Time Provider Department Center   1/17/2025  8:20 AM Karuna Laboy MD [facility]   2/4/2025 10:40 AM Karuna Laboy MD [facility]     Labs:   Component      Latest Ref Rng 1/23/2024   WBC      4.0 - 11.0 x10(3) uL 2.9 (L)    RBC      3.80 - 5.30 x10(6)uL 3.99    Hemoglobin      12.0 - 16.0 g/dL 12.5    Hematocrit      35.0 - 48.0 % 36.9    MCV      80.0 - 100.0 fL 92.5    MCH      26.0 - 34.0 pg 31.3    MCHC      31.0 - 37.0 g/dL 33.9    RDW-SD      35.1 - 46.3 fL 41.4    RDW      11.0 - 15.0 % 12.1    Platelet Count      150.0 - 450.0 10(3)uL 298.0    Prelim Neutrophil Abs      1.50 - 7.70 x10 (3) uL 1.69    Neutrophils Absolute      1.50 - 7.70 x10(3) uL 1.69    Lymphocytes Absolute      1.00 - 4.00 x10(3) uL 0.72 (L)    Monocytes Absolute      0.10 - 1.00 x10(3) uL 0.34    Eosinophils Absolute      0.00 - 0.70 x10(3) uL 0.09    Basophils Absolute      0.00 - 0.20 x10(3) uL 0.04    Immature Granulocyte Absolute      0.00 - 1.00 x10(3) uL 0.00    Neutrophils %      % 58.7    Lymphocytes %      % 25.0    Monocytes %      % 11.8    Eosinophils %      % 3.1    Basophils %      % 1.4    Immature Granulocyte %      % 0.0    CREATININE      0.55 - 1.02 mg/dL 0.68    EGFR      >=60 mL/min/1.73m2 97    COMPLEMENT C4      12.0 - 36.0 mg/dL 14.0    COMPLEMENT C3      90.0 - 170.0 mg/dL 73.2 (L)    Anti Double Strand DNA      <10  <10    SED RATE      0 - 30 mm/Hr 12    C-REACTIVE PROTEIN      <1.00 mg/dL <0.40    AST (SGOT)      <=34 U/L 22    ALT (SGPT)      10 - 49 U/L 16    Albumin      3.2 - 4.8 g/dL 4.6       Legend:  (L) Low    ASSESSMENT/PLAN:      Overlap syndrome/SLE (+ CARL, double-stranded DNA, low C3, RF, chilblains, Raynaud's, chronic livedo reticularis, telangiectasias)   - Initially  diagnosed with inflammatory arthritis and was on methotrexate but discontinued as it caused high LFTs.  Then on Humira for 9 months but did not help  - She also has findings of telangiectasias, skin thickening and enlargement in her hands that can be seen in scleroderma.  Myositis panel negative  - CXR normal, no evidence of ILD  - Now on Plaquenil 200 mg daily.  She follows with ophthalmology every 6 months, last seen Jan 4, 2024  - For her Raynaud's and chilblains she was on nifedipine but causing worsening pedal edema so therefore stopped it  - Raynaud's is worse.  Will try sildenafil 20 mg daily she will increase it to 40 mg daily if she can tolerate it.  Advised to watch for dizziness, flushing and lightheadedness  - Blood work reviewed mildly low white count  - she will get blood work in 6 mos      Bilateral temporal pain, resolved  - Temporal biopsy is done in the summer 2019 were negative     Pt will f/u in 6 mos      There is a longitudinal care relationship with me, the care plan reflects the ongoing nature of the continuous relationship of care, and the medical record indicates that there is ongoing treatment of a serious/complex medical condition which I am currently managing.  is Applicable.      Karuna Laboy MD  7/17/2024   9:14 AM

## 2024-11-22 RX ORDER — SILDENAFIL CITRATE 20 MG/1
20 TABLET ORAL 2 TIMES DAILY
Qty: 60 TABLET | Refills: 0 | Status: SHIPPED | OUTPATIENT
Start: 2024-11-22

## 2024-11-29 ENCOUNTER — TELEPHONE (OUTPATIENT)
Dept: RHEUMATOLOGY | Facility: CLINIC | Age: 64
End: 2024-11-29

## 2025-01-28 NOTE — TELEPHONE ENCOUNTER
Requested Prescriptions     Pending Prescriptions Disp Refills    hydroxychloroquine 200 MG Oral Tab 90 tablet 1     Sig: Take 1 tablet (200 mg total) by mouth daily.     Future Appointments   Date Time Provider Department Center   2/4/2025 10:40 AM Karuna Laboy MD [facility]     LOV: 7/17/24   Last Refilled:      ASSESSMENT/PLAN:      Overlap syndrome/SLE (+ CARL, double-stranded DNA, low C3, RF, chilblains, Raynaud's, chronic livedo reticularis, telangiectasias)   - Initially diagnosed with inflammatory arthritis and was on methotrexate but discontinued as it caused high LFTs.  Then on Humira for 9 months but did not help  - She also has findings of telangiectasias, skin thickening and enlargement in her hands that can be seen in scleroderma.  Myositis panel negative  - CXR normal, no evidence of ILD  - Now on Plaquenil 200 mg daily.  She follows with ophthalmology every 6 months, last seen Jan 4, 2024  - For her Raynaud's and chilblains she was on nifedipine but causing worsening pedal edema so therefore stopped it  - Raynaud's is worse.  Will try sildenafil 20 mg daily she will increase it to 40 mg daily if she can tolerate it.  Advised to watch for dizziness, flushing and lightheadedness  - Blood work reviewed mildly low white count  - she will get blood work in 6 mos      Bilateral temporal pain, resolved  - Temporal biopsy is done in the summer 2019 were negative     Pt will f/u in 6 mos      There is a longitudinal care relationship with me, the care plan reflects the ongoing nature of the continuous relationship of care, and the medical record indicates that there is ongoing treatment of a serious/complex medical condition which I am currently managing.  is Applicable.      Karuna Laboy MD  7/17/2024   9:14 AM

## 2025-01-29 RX ORDER — HYDROXYCHLOROQUINE SULFATE 200 MG/1
200 TABLET, FILM COATED ORAL DAILY
Qty: 90 TABLET | Refills: 1 | Status: SHIPPED | OUTPATIENT
Start: 2025-01-29

## 2025-01-29 RX ORDER — HYDROXYCHLOROQUINE SULFATE 200 MG/1
200 TABLET, FILM COATED ORAL DAILY
Qty: 90 TABLET | Refills: 1 | Status: SHIPPED | OUTPATIENT
Start: 2025-01-29 | End: 2025-01-29

## 2025-01-29 RX ORDER — HYDROXYCHLOROQUINE SULFATE 200 MG/1
200 TABLET, FILM COATED ORAL DAILY
Qty: 90 TABLET | Refills: 1 | Status: CANCELLED | OUTPATIENT
Start: 2025-01-29

## 2025-02-04 ENCOUNTER — OFFICE VISIT (OUTPATIENT)
Dept: RHEUMATOLOGY | Facility: CLINIC | Age: 65
End: 2025-02-04
Payer: MEDICARE

## 2025-02-04 ENCOUNTER — LAB ENCOUNTER (OUTPATIENT)
Dept: LAB | Facility: HOSPITAL | Age: 65
End: 2025-02-04
Attending: INTERNAL MEDICINE
Payer: MEDICARE

## 2025-02-04 VITALS
SYSTOLIC BLOOD PRESSURE: 160 MMHG | HEART RATE: 74 BPM | DIASTOLIC BLOOD PRESSURE: 94 MMHG | RESPIRATION RATE: 16 BRPM | WEIGHT: 113.5 LBS | HEIGHT: 67.5 IN | BODY MASS INDEX: 17.61 KG/M2

## 2025-02-04 DIAGNOSIS — M32.9 SYSTEMIC LUPUS ERYTHEMATOSUS, UNSPECIFIED SLE TYPE, UNSPECIFIED ORGAN INVOLVEMENT STATUS (HCC): Primary | ICD-10-CM

## 2025-02-04 DIAGNOSIS — I73.00 RAYNAUD'S DISEASE WITHOUT GANGRENE: ICD-10-CM

## 2025-02-04 DIAGNOSIS — M85.89 OSTEOPENIA OF MULTIPLE SITES: ICD-10-CM

## 2025-02-04 DIAGNOSIS — M32.9 SYSTEMIC LUPUS ERYTHEMATOSUS, UNSPECIFIED SLE TYPE, UNSPECIFIED ORGAN INVOLVEMENT STATUS (HCC): ICD-10-CM

## 2025-02-04 DIAGNOSIS — Z51.81 MEDICATION MONITORING ENCOUNTER: ICD-10-CM

## 2025-02-04 LAB
ALT SERPL-CCNC: 27 U/L
AST SERPL-CCNC: 27 U/L (ref ?–34)
BASOPHILS # BLD AUTO: 0.05 X10(3) UL (ref 0–0.2)
BASOPHILS NFR BLD AUTO: 1.4 %
C3 SERPL-MCNC: 82.9 MG/DL (ref 90–170)
C4 SERPL-MCNC: 9.8 MG/DL (ref 12–36)
CREAT BLD-MCNC: 0.76 MG/DL
CREAT UR-SCNC: 67.4 MG/DL
CRP SERPL-MCNC: <0.4 MG/DL (ref ?–1)
DEPRECATED RDW RBC AUTO: 39.2 FL (ref 35.1–46.3)
EGFRCR SERPLBLD CKD-EPI 2021: 86 ML/MIN/1.73M2 (ref 60–?)
EOSINOPHIL # BLD AUTO: 0.06 X10(3) UL (ref 0–0.7)
EOSINOPHIL NFR BLD AUTO: 1.6 %
ERYTHROCYTE [DISTWIDTH] IN BLOOD BY AUTOMATED COUNT: 11.9 % (ref 11–15)
ERYTHROCYTE [SEDIMENTATION RATE] IN BLOOD: 9 MM/HR
HCT VFR BLD AUTO: 39.8 %
HGB BLD-MCNC: 13.8 G/DL
IMM GRANULOCYTES # BLD AUTO: 0.01 X10(3) UL (ref 0–1)
IMM GRANULOCYTES NFR BLD: 0.3 %
LYMPHOCYTES # BLD AUTO: 0.83 X10(3) UL (ref 1–4)
LYMPHOCYTES NFR BLD AUTO: 22.6 %
MCH RBC QN AUTO: 31.2 PG (ref 26–34)
MCHC RBC AUTO-ENTMCNC: 34.7 G/DL (ref 31–37)
MCV RBC AUTO: 90 FL
MONOCYTES # BLD AUTO: 0.31 X10(3) UL (ref 0.1–1)
MONOCYTES NFR BLD AUTO: 8.4 %
NEUTROPHILS # BLD AUTO: 2.41 X10 (3) UL (ref 1.5–7.7)
NEUTROPHILS # BLD AUTO: 2.41 X10(3) UL (ref 1.5–7.7)
NEUTROPHILS NFR BLD AUTO: 65.7 %
PLATELET # BLD AUTO: 295 10(3)UL (ref 150–450)
PROT UR-MCNC: 10.6 MG/DL (ref ?–14)
PROT/CREAT UR-RTO: 0.16
RBC # BLD AUTO: 4.42 X10(6)UL
WBC # BLD AUTO: 3.7 X10(3) UL (ref 4–11)

## 2025-02-04 PROCEDURE — 85652 RBC SED RATE AUTOMATED: CPT

## 2025-02-04 PROCEDURE — 84156 ASSAY OF PROTEIN URINE: CPT

## 2025-02-04 PROCEDURE — 86225 DNA ANTIBODY NATIVE: CPT

## 2025-02-04 PROCEDURE — 85025 COMPLETE CBC W/AUTO DIFF WBC: CPT

## 2025-02-04 PROCEDURE — 36415 COLL VENOUS BLD VENIPUNCTURE: CPT

## 2025-02-04 PROCEDURE — 86140 C-REACTIVE PROTEIN: CPT

## 2025-02-04 PROCEDURE — 86160 COMPLEMENT ANTIGEN: CPT

## 2025-02-04 PROCEDURE — 99214 OFFICE O/P EST MOD 30 MIN: CPT | Performed by: INTERNAL MEDICINE

## 2025-02-04 PROCEDURE — 82570 ASSAY OF URINE CREATININE: CPT

## 2025-02-04 PROCEDURE — 82565 ASSAY OF CREATININE: CPT

## 2025-02-04 PROCEDURE — 84450 TRANSFERASE (AST) (SGOT): CPT

## 2025-02-04 PROCEDURE — 84460 ALANINE AMINO (ALT) (SGPT): CPT

## 2025-02-04 PROCEDURE — G2211 COMPLEX E/M VISIT ADD ON: HCPCS | Performed by: INTERNAL MEDICINE

## 2025-02-04 RX ORDER — AMLODIPINE BESYLATE 2.5 MG/1
2.5 TABLET ORAL DAILY
Qty: 30 TABLET | Refills: 0 | Status: SHIPPED | OUTPATIENT
Start: 2025-02-04

## 2025-02-04 NOTE — PATIENT INSTRUCTIONS
You were seen today for lupus and scleroderma  Continue hydroxychloroquine  I started you on Norvasc 2.5 mg daily, if you do not have any swelling in the legs or flushing we can increase it to 5 mg daily  Let me know  Get your bone density done  Can follow-up in 6 months

## 2025-02-04 NOTE — PROGRESS NOTES
Pauline Allen is a 64 year old female.    HPI:     Chief Complaint   Patient presents with    SLE    Medication Follow-Up       I had the pleasure of seeing Pauline Allen on 24/2025 for follow up SLE (+ CARL, double-stranded DNA, low C3, RF, chilblains, Raynaud's, chronic livedo reticularis)     Current medications:   mg daily   Fosamax- for about 5-7 years, stopped 2023 for drug holiday   Previous medications:  Humira- stopped due to perforation of colon from routine colonoscopy   Methotrexate- no s/e but stopped after perforation of colon from routine colonoscopy   Nifedipine 30 mg daily- causes pedal edema and swelling   Sildenafil 40 mg daily- had s/e with racing heart   Blood work:  AVI 2017: +ANA1:320 nuclear pattern, +dsDNA 334, RF 8.1, Cardiolipin IgM 47, atypical p-ANCA 1:80  Neg CARL, dsDNA, ESR, RF, CCPLyme , Low C3  Neg myositis panel and ANCA    Interval History:  This is a 64 yo F with hx of Osteopenia, Colonic perforation s/p partial resection 2015 presents to establish care for lupus.  She was initially seen by Dr. Santiago Hill in 2017 and at that time presented with joint pain and swelling involving her hands.  She was found to have a positive RF and was treated with methotrexate.  She then developed high inflammation markers and was switched to Humira.  She was on Humira for about 9 months with no improvement.  She was also responsive to prednisone.  She then has been seeing rheumatologist Dr. Bangura and eventually diagnosed with lupus.  She was found to have a + CARL, double-stranded DNA, RF, low C3, chilblains, Raynaud's, livedo reticularis.  She has been maintained on Plaquenil 200 mg daily and nifedipine 30 mg daily.  In the winter her Raynaud's and chilblains worsens.  She has thickening of her fingers and during the winter he has become very dry and look like  hands.  She has no weakness in her muscles.  Denies any acid reflux.  Has some telangiectasia on her nose.   She has minimal joint pain involving her wrists.  Has some mild swelling in her right knee but no pain.  Denies any shortness of breath or chest pain.  Has chronic diarrhea after colonic perforation.  In the summer 2019 she had temple pain, temporal biopsy was negative for GCA.  Mom had ANCA vasculitis and passed away from it.    1/23/2024:  Presents for f/u of Overlap syndrome/SLE (+ CARL, double-stranded DNA, low C3, RF, chilblains, Raynaud's, chronic livedo reticularis)   She reports that her chilblains is pretty severe in December.  She had red nodules on her fingers.  Now on nifedipine and her symptoms have improved  Has some mild stiffness in her joints otherwise no swelling in her joints.  Mild pain in her wrist.  Denies any recent rash.  No recent infection.  She was seen by eye doctor January 4, no evidence of Plaquenil toxicity    7/17/2024:  Presents for f/u of Overlap syndrome/SLE (+ CARL, double-stranded DNA, low C3, RF, chilblains, Raynaud's, chronic livedo reticularis)  Stopped nifedipine as it was causing pedal edema and even facial edema, resolved once stopped the raynaunds has worsened   Any fluctuation in temperature causing raynaunds to worsen, fingers are numb, no sores  Some mild wrist pain, no swelling  No sob or trouble swallowing   She does livedo reticularis in lower legs     2/4/2025:  Presents for f/u of Overlap syndrome/SLE (+ CARL, double-stranded DNA, low C3, RF, chilblains, Raynaud's, chronic livedo reticularis)  She tried sildenafil but had s/e  Also on hydroxychloroquine 200 mg daily  She was seen by ophthalmology 10/2024, no evidence of retinal toxicity  Continues to have raynaunds but its tolerable  She denies any sores in her fingers  No chilblains   No sob or sob  No joint pain or swelling           HISTORY:  Past Medical History:    Family history of rheumatoid arthritis    Raynaud's disease      Social Hx Reviewed   Family Hx Reviewed     Medications (Active prior to today's  visit):  Current Outpatient Medications   Medication Sig Dispense Refill    hydroxychloroquine 200 MG Oral Tab Take 1 tablet (200 mg total) by mouth daily. 90 tablet 1    sildenafil 20 MG Oral Tab Take 1 tablet (20 mg total) by mouth 2 (two) times daily. 60 tablet 0    NIFEdipine ER 30 MG Oral Tablet 24 Hr Take 1 tablet (30 mg total) by mouth daily. (Patient not taking: Reported on 7/17/2024) 90 tablet 1    Atomoxetine HCl 80 MG Oral Cap Take 1 capsule (80 mg total) by mouth daily.      triamcinolone 0.1 % External Cream Use on fingers 1-2 times a day as needed 1 each 0    amphetamine-dextroamphetamine (ADDERALL) 20 MG Oral Tab       alendronate 70 MG Oral Tab Take 1 tablet (70 mg total) by mouth every 7 days. 13 tablet 1    gabapentin 300 MG Oral Cap Take one tablet one the first day, then take one tablet twice a day on day 2, then three times daily as needed 60 capsule 1    Lisdexamfetamine Dimesylate 70 MG Oral Cap Take 70 mg by mouth every morning.      Omega-3 Fatty Acids (FISH OIL OR) daily      aspirin 81 MG Oral Tab Take 81 mg by mouth daily.      escitalopram 10 MG Oral Tab Take 1 tablet (10 mg total) by mouth daily.  0    TraZODone HCl 50 MG Oral Tab Take 1 tablet (50 mg total) by mouth nightly.       .cmed  Allergies:  Allergies   Allergen Reactions    Nickel ITCHING         ROS:   All other ROS are negative.     PHYSICAL EXAM:   GEN: AAOx3, NAD  HEENT: EOMI, PERRLA, no injection or icterus, oral mucosa moist, no oral lesions. No lymphadenopathy. No facial rash  CVS: RRR, no murmurs rubs or gallops. Equal 2+ distal pulses.   LUNGS: CTAB, no increased work of breathing  ABDOMEN:  soft NT/ND, +BS, no HSM  SKIN: mild telangiectasia on her nose, + Livedo reticularis in her legs  MSK:  Bilateral hands/fingers with enlargement.  Peripheral cyanosis in hands. Able to make a fist  NEURO: Cranial nerves II-XII intact grossly. 5/5 strength throughout in both upper and lower extremities, sensation intact.  PSYCH:  normal mood       LABS:     Component      Latest Ref [facility] 7/27/2023   WBC      4.0 - 11.0 x10(3) uL 3.1 (L)    RBC      3.80 - 5.30 x10(6)uL 4.19    Hemoglobin      12.0 - 16.0 g/dL 13.0    Hematocrit      35.0 - 48.0 % 38.5    MCV      80.0 - 100.0 fL 91.9    MCH      26.0 - 34.0 pg 31.0    MCHC      31.0 - 37.0 g/dL 33.8    RDW-SD      35.1 - 46.3 fL 42.0    RDW      11.0 - 15.0 % 12.5    Platelet Count      150.0 - 450.0 10(3)uL 259.0    Prelim Neutrophil Abs      1.50 - 7.70 x10 (3) uL 2.01    Neutrophils Absolute      1.50 - 7.70 x10(3) uL 2.01    Lymphocytes Absolute      1.00 - 4.00 x10(3) uL 0.70 (L)    Monocytes Absolute      0.10 - 1.00 x10(3) uL 0.28    Eosinophils Absolute      0.00 - 0.70 x10(3) uL 0.08    Basophils Absolute      0.00 - 0.20 x10(3) uL 0.05    Immature Granulocyte Absolute      0.00 - 1.00 x10(3) uL 0.00    Neutrophils %      % 64.4    Lymphocytes %      % 22.4    Monocytes %      % 9.0    Eosinophils %      % 2.6    Basophils %      % 1.6    Immature Granulocyte %      % 0.0    Glucose      70 - 99 mg/dL 108 (H)    Sodium      136 - 145 mmol/L 135 (L)    Potassium      3.5 - 5.1 mmol/L 3.9    Chloride      98 - 112 mmol/L 101    Carbon Dioxide, Total      21.0 - 32.0 mmol/L 29.0    ANION GAP      0 - 18 mmol/L 5    BUN      7 - 18 mg/dL 10    CREATININE      0.55 - 1.02 mg/dL 0.71    BUN/CREATININE RATIO      10.0 - 20.0  14.1    CALCIUM      8.5 - 10.1 mg/dL 9.4    CALCULATED OSMOLALITY      275 - 295 mOsm/kg 280    EGFR      >=60 mL/min/1.73m2 94    ALT (SGPT)      13 - 56 U/L 29    AST (SGOT)      15 - 37 U/L 21    ALKALINE PHOSPHATASE      50 - 130 U/L 47 (L)    Total Bilirubin      0.1 - 2.0 mg/dL 0.5    PROTEIN, TOTAL      6.4 - 8.2 g/dL 7.4    Albumin      3.4 - 5.0 g/dL 4.2    Globulin      2.8 - 4.4 g/dL 3.2    A/G Ratio      1.0 - 2.0  1.3    Patient Fasting for CMP? No    Anti-WICHO-1      <20 Units <20    Anti-PL-12 Ab (RDL)      Negative  Negative    Anti-EJ Ab (RDL)       Negative  Negative    Anti-OJ Ab (RDL)      Negative  Negative    Anti-SRP Ab (RDL)      Negative  Negative    Anti-Mi-2 Ab (RDL)      Negative  Negative    Anti-TIF-1gamma Ab (RDL)      <20 Units <20    Anti-MDA-5 Ab (CADM-140) (RDL)      <20 Units <20    Anti-NXP-2 (P140) Ab (RDL)      <20 Units <20    Anti-SAE1 Ab (RDL)      <20 Units <20    Anti-Pm/Scl-100 Ab (RDL)      <20 Units <20    Anti-Ku Ab (RDL)      Negative  Negative    Anti-SS-A 52kD Ab, IgG (RDL)      <20 Units <20    Anti-U1 RNP Ab (RDL)      <20 Units <20    Anti-U2 RNP Ab (RDL)      Negative  Negative    Anti-U3 RNP (Fibrillarin) (RDL)      Negative  Negative    Anti-PL-7 Ab (RDL)      Negative  Negative    Color Urine      Yellow  Colorless !    Clarity Urine      Clear  Clear    Spec Gravity      1.005 - 1.030  <1.005 (L)    Glucose Urine      Normal mg/dL Normal    Bilirubin Urine      Negative  Negative    Ketones, UA      Negative mg/dL Negative    Blood Urine      Negative  Negative    PH Urine      5.0 - 8.0  6.5    Protein Urine      Negative, Trace mg/dL Negative    Urobilinogen Urine      Normal  Normal    Nitrite Urine      Negative  Negative    Leukocyte Esterase       Negative  Negative    TOTAL PROTEIN URINE RANDOM      mg/dL <5.0    CREATININE UR RANDOM      mg/dL 17.50    Urine Protein/Creatinine Ratio, Random --    ANCA by IFA (RDL)      Negative  Negative    MYELOPEROX ANTIBODIES, IGG      <20 Units <20    SERINE PROTEASE3, IGG      <20 Units <20    C-REACTIVE PROTEIN      <0.30 mg/dL <0.29    SED RATE      0 - 30 mm/Hr 8    COMPLEMENT C3      90.0 - 180.0 mg/dL 73.3 (L)    COMPLEMENT C4      10.0 - 40.0 mg/dL 12.7         Imaging:     None    ASSESSMENT/PLAN:     Overlap syndrome/SLE (+ CARL, double-stranded DNA, low C3, RF, chilblains, Raynaud's, chronic livedo reticularis, telangiectasias)   - Initially diagnosed with inflammatory arthritis and was on methotrexate but discontinued as it caused high LFTs.  Then on Humira for 9  months but did not help  - She also has findings of telangiectasias, skin thickening and enlargement in her hands that can be seen in scleroderma.  Myositis panel negative  - CXR normal, no evidence of ILD  - Now on Plaquenil 200 mg daily.  She follows with ophthalmology every 6 months, last seen Oct 2024  - For her Raynaud's and chilblains she was on nifedipine but causing worsening pedal edema. Sildenafil caused s/e  - she will try low-dose Norvasc 2.5 mg daily.  Advised that could cause some swelling in the legs and flushing but she will try low-dose.  If she has no side effects will increase to 5 mg daily  - Blood work today     Osteopenia  - Last bone density December 2023  - She was on Fosamax for 5 to 7 years, stopped in 2023  - Plan to repeat bone density    Bilateral temporal pain, resolved  - Temporal biopsy is done in the summer 2019 were negative    Pt will f/u in 6 mos     There is a longitudinal care relationship with me, the care plan reflects the ongoing nature of the continuous relationship of care, and the medical record indicates that there is ongoing treatment of a serious/complex medical condition which I am currently managing.  is Applicable.     Karuna Laboy MD  2/4/2025  10:40 AM

## 2025-02-07 LAB — DSDNA AB TITR SER: <10 {TITER}

## 2025-02-28 DIAGNOSIS — I73.00 RAYNAUD'S DISEASE WITHOUT GANGRENE: ICD-10-CM

## 2025-02-28 RX ORDER — AMLODIPINE BESYLATE 2.5 MG/1
2.5 TABLET ORAL DAILY
Qty: 30 TABLET | Refills: 0 | Status: SHIPPED | OUTPATIENT
Start: 2025-02-28

## 2025-02-28 NOTE — TELEPHONE ENCOUNTER
LOV: 2/4/25  Future Appointments   Date Time Provider Department Center   8/4/2025 10:00 AM Karuna Laboy MD [facility]       ASSESSMENT/PLAN:      Overlap syndrome/SLE (+ CARL, double-stranded DNA, low C3, RF, chilblains, Raynaud's, chronic livedo reticularis, telangiectasias)   - Initially diagnosed with inflammatory arthritis and was on methotrexate but discontinued as it caused high LFTs.  Then on Humira for 9 months but did not help  - She also has findings of telangiectasias, skin thickening and enlargement in her hands that can be seen in scleroderma.  Myositis panel negative  - CXR normal, no evidence of ILD  - Now on Plaquenil 200 mg daily.  She follows with ophthalmology every 6 months, last seen Oct 2024  - For her Raynaud's and chilblains she was on nifedipine but causing worsening pedal edema. Sildenafil caused s/e  - she will try low-dose Norvasc 2.5 mg daily.  Advised that could cause some swelling in the legs and flushing but she will try low-dose.  If she has no side effects will increase to 5 mg daily  - Blood work today      Osteopenia  - Last bone density December 2023  - She was on Fosamax for 5 to 7 years, stopped in 2023  - Plan to repeat bone density     Bilateral temporal pain, resolved  - Temporal biopsy is done in the summer 2019 were negative     Pt will f/u in 6 mos      There is a longitudinal care relationship with me, the care plan reflects the ongoing nature of the continuous relationship of care, and the medical record indicates that there is ongoing treatment of a serious/complex medical condition which I am currently managing.  is Applicable.      Karuna Laboy MD  2/4/2025  10:40 AM

## 2025-04-02 DIAGNOSIS — I73.00 RAYNAUD'S DISEASE WITHOUT GANGRENE: ICD-10-CM

## 2025-04-02 RX ORDER — AMLODIPINE BESYLATE 2.5 MG/1
2.5 TABLET ORAL DAILY
Qty: 30 TABLET | Refills: 3 | Status: SHIPPED | OUTPATIENT
Start: 2025-04-02

## 2025-04-02 NOTE — TELEPHONE ENCOUNTER
LOV: 2/4/25  Future Appointments   Date Time Provider Department Center   8/4/2025 10:00 AM Karuna Laboy MD [facility]   12/2/2025 10:00 AM [facility]       ASSESSMENT/PLAN:      Overlap syndrome/SLE (+ CARL, double-stranded DNA, low C3, RF, chilblains, Raynaud's, chronic livedo reticularis, telangiectasias)   - Initially diagnosed with inflammatory arthritis and was on methotrexate but discontinued as it caused high LFTs.  Then on Humira for 9 months but did not help  - She also has findings of telangiectasias, skin thickening and enlargement in her hands that can be seen in scleroderma.  Myositis panel negative  - CXR normal, no evidence of ILD  - Now on Plaquenil 200 mg daily.  She follows with ophthalmology every 6 months, last seen Oct 2024  - For her Raynaud's and chilblains she was on nifedipine but causing worsening pedal edema. Sildenafil caused s/e  - she will try low-dose Norvasc 2.5 mg daily.  Advised that could cause some swelling in the legs and flushing but she will try low-dose.  If she has no side effects will increase to 5 mg daily  - Blood work today      Osteopenia  - Last bone density December 2023  - She was on Fosamax for 5 to 7 years, stopped in 2023  - Plan to repeat bone density     Bilateral temporal pain, resolved  - Temporal biopsy is done in the summer 2019 were negative     Pt will f/u in 6 mos      There is a longitudinal care relationship with me, the care plan reflects the ongoing nature of the continuous relationship of care, and the medical record indicates that there is ongoing treatment of a serious/complex medical condition which I am currently managing.  is Applicable.      Karuna Laboy MD  2/4/2025  10:40 AM

## 2025-07-28 DIAGNOSIS — I73.00 RAYNAUD'S DISEASE WITHOUT GANGRENE: ICD-10-CM

## 2025-07-29 RX ORDER — AMLODIPINE BESYLATE 2.5 MG/1
2.5 TABLET ORAL DAILY
Qty: 30 TABLET | Refills: 3 | Status: SHIPPED | OUTPATIENT
Start: 2025-07-29

## 2025-07-29 RX ORDER — HYDROXYCHLOROQUINE SULFATE 200 MG/1
200 TABLET, FILM COATED ORAL DAILY
Qty: 90 TABLET | Refills: 1 | Status: SHIPPED | OUTPATIENT
Start: 2025-07-29 | End: 2025-08-01

## 2025-08-01 ENCOUNTER — PATIENT MESSAGE (OUTPATIENT)
Age: 65
End: 2025-08-01

## 2025-08-01 RX ORDER — HYDROXYCHLOROQUINE SULFATE 200 MG/1
200 TABLET, FILM COATED ORAL DAILY
Qty: 90 TABLET | Refills: 1 | Status: SHIPPED | OUTPATIENT
Start: 2025-08-01